# Patient Record
Sex: MALE | Race: WHITE | URBAN - METROPOLITAN AREA
[De-identification: names, ages, dates, MRNs, and addresses within clinical notes are randomized per-mention and may not be internally consistent; named-entity substitution may affect disease eponyms.]

---

## 2018-03-14 ENCOUNTER — OFFICE VISIT (OUTPATIENT)
Dept: FAMILY MEDICINE | Facility: CLINIC | Age: 38
End: 2018-03-14
Payer: COMMERCIAL

## 2018-03-14 VITALS
WEIGHT: 230.2 LBS | RESPIRATION RATE: 16 BRPM | BODY MASS INDEX: 30.51 KG/M2 | SYSTOLIC BLOOD PRESSURE: 124 MMHG | DIASTOLIC BLOOD PRESSURE: 80 MMHG | TEMPERATURE: 97.8 F | HEIGHT: 73 IN | HEART RATE: 80 BPM

## 2018-03-14 DIAGNOSIS — E66.9 NON MORBID OBESITY, UNSPECIFIED OBESITY TYPE: ICD-10-CM

## 2018-03-14 DIAGNOSIS — Z83.3 FAMILY HISTORY OF DIABETES MELLITUS: ICD-10-CM

## 2018-03-14 DIAGNOSIS — E78.2 MIXED HYPERLIPIDEMIA: Primary | ICD-10-CM

## 2018-03-14 DIAGNOSIS — R73.01 IMPAIRED FASTING GLUCOSE: ICD-10-CM

## 2018-03-14 DIAGNOSIS — Z23 NEED FOR VACCINATION: ICD-10-CM

## 2018-03-14 DIAGNOSIS — Z00.00 ENCOUNTER FOR ROUTINE ADULT HEALTH EXAMINATION WITHOUT ABNORMAL FINDINGS: Primary | ICD-10-CM

## 2018-03-14 DIAGNOSIS — Z13.220 LIPID SCREENING: ICD-10-CM

## 2018-03-14 LAB
CHOLEST SERPL-MCNC: 219 MG/DL
GLUCOSE SERPL-MCNC: 100 MG/DL (ref 70–99)
HDLC SERPL-MCNC: 42 MG/DL
LDLC SERPL CALC-MCNC: 163 MG/DL
NONHDLC SERPL-MCNC: 177 MG/DL
TRIGL SERPL-MCNC: 70 MG/DL

## 2018-03-14 PROCEDURE — 80061 LIPID PANEL: CPT | Performed by: FAMILY MEDICINE

## 2018-03-14 PROCEDURE — 82947 ASSAY GLUCOSE BLOOD QUANT: CPT | Performed by: FAMILY MEDICINE

## 2018-03-14 PROCEDURE — 99395 PREV VISIT EST AGE 18-39: CPT | Mod: 25 | Performed by: FAMILY MEDICINE

## 2018-03-14 PROCEDURE — 90471 IMMUNIZATION ADMIN: CPT | Performed by: FAMILY MEDICINE

## 2018-03-14 PROCEDURE — 90715 TDAP VACCINE 7 YRS/> IM: CPT | Performed by: FAMILY MEDICINE

## 2018-03-14 PROCEDURE — 36415 COLL VENOUS BLD VENIPUNCTURE: CPT | Performed by: FAMILY MEDICINE

## 2018-03-14 NOTE — PATIENT INSTRUCTIONS
Go to [facility] now for your blood draw.  You will be contacted in 1-2 days for results of your lab tests.    Be consistent with low trans fat and saturated fat diet.  Eat food rich in omega-3-fatty acids as you tolerate. (salmon, olive oil)  Eat 5 cups of vegetables, fruits and whole grains per day.  Limit starchy food (white rice, white bread, white pasta, white potatoes) to less than a cup per meal.  Minimize sweets, junk food and fastfood. Limit soda beverages to one serving per day; best to avoid it altogether though.  Exercise: moderate intensity sustained for at least 30 mins per episode, goal of 150 mins per week at least  Combine cardiovascular and resistance exercises.  These exercise recommendations are in addition to your daily activity at work or home.  Work on losing weight.    Preventive Health Recommendations  Male Ages 26 - 39    Yearly exam:             See your health care provider every year in order to  o   Review health changes.   o   Discuss preventive care.    o   Review your medicines if your doctor has prescribed any.    You should be tested each year for STDs (sexually transmitted diseases), if you re at risk.     After age 35, talk to your provider about cholesterol testing. If you are at risk for heart disease, have your cholesterol tested at least every 5 years.     If you are at risk for diabetes, you should have a diabetes test (fasting glucose).  Shots: Get a flu shot each year. Get a tetanus shot every 10 years.     Nutrition:    Eat at least 5 servings of fruits and vegetables daily.     Eat whole-grain bread, whole-wheat pasta and brown rice instead of white grains and rice.     Talk to your provider about Calcium and Vitamin D.     Lifestyle    Exercise for at least 150 minutes a week (30 minutes a day, 5 days a week). This will help you control your weight and prevent disease.     Limit alcohol to one drink per day.     No smoking.     Wear sunscreen to prevent skin cancer.      See your dentist every six months for an exam and cleaning.

## 2018-03-14 NOTE — PROGRESS NOTES
SUBJECTIVE:   CC: Jose Alejandro Noonan is an 37 year old male who presents for preventative health visit.     Healthy Habits:    Do you get at least three servings of calcium containing foods daily (dairy, green leafy vegetables, etc.)? yes    Amount of exercise or daily activities, outside of work: 0 day(s) per week. Patient walks a lot at work.    Problems taking medications regularly No    Medication side effects: No    Have you had an eye exam in the past two years? no    Do you see a dentist twice per year? no    Do you have sleep apnea, excessive snoring or daytime drowsiness?no         Today's PHQ-2 Score:   PHQ-2 ( 1999 Pfizer) 3/14/2018   Q1: Little interest or pleasure in doing things 0   Q2: Feeling down, depressed or hopeless 0   PHQ-2 Score 0       Abuse: Current or Past(Physical, Sexual or Emotional)- No  Do you feel safe in your environment - Yes    Social History   Substance Use Topics     Smoking status: Former Smoker     Smokeless tobacco: Never Used     Alcohol use Yes      Comment: occ      Patient denies using any other tobacco product now.    If you drink alcohol do you typically have >3 drinks per day or >7 drinks per week? No                      Last PSA: No results found for: PSA    Reviewed orders with patient. Reviewed health maintenance and updated orders accordingly - Yes  There is no problem list on file for this patient.    Past Surgical History:   Procedure Laterality Date     ORTHOPEDIC SURGERY Left 10/30/02    wrist       Social History   Substance Use Topics     Smoking status: Former Smoker     Smokeless tobacco: Never Used     Alcohol use Yes      Comment: occ     Family History   Problem Relation Age of Onset     DIABETES Sister      Other Cancer Maternal Grandmother      Thyroid Disease Sister          No current outpatient prescriptions on file.     Allergies   Allergen Reactions     Penicillins Rash       Reviewed and updated as needed this visit by clinical staff  Tobacco   "Allergies  Meds  Problems  Med Hx  Surg Hx  Fam Hx  Soc Hx          Reviewed and updated as needed this visit by Provider  Allergies  Meds  Problems        History reviewed. No pertinent past medical history.   Past Surgical History:   Procedure Laterality Date     ORTHOPEDIC SURGERY Left 10/30/02    wrist       ROS:  C: NEGATIVE for fever, chills, change in weight  I: NEGATIVE for worrisome rashes, moles or lesions  E: NEGATIVE for vision changes or irritation  ENT: NEGATIVE for ear, mouth and throat problems  R: NEGATIVE for significant cough or SOB  CV: NEGATIVE for chest pain, palpitations or peripheral edema  GI: NEGATIVE for nausea, abdominal pain, heartburn, or change in bowel habits   male: negative for dysuria, hematuria, decreased urinary stream, erectile dysfunction, urethral discharge  M: NEGATIVE for significant arthralgias or myalgia  N: NEGATIVE for weakness, dizziness or paresthesias  E: NEGATIVE for temperature intolerance, skin/hair changes  H: NEGATIVE for bleeding problems  P: NEGATIVE for changes in mood or affect    OBJECTIVE:   /80  Pulse 80  Temp 97.8  F (36.6  C) (Tympanic)  Resp 16  Ht 6' 0.75\" (1.848 m)  Wt 230 lb 3.2 oz (104.4 kg)  BMI 30.58 kg/m2  EXAM:  GENERAL APPEARANCE: borderline obese, alert and no distress  EYES: pink conj, no icterus, PERRL, EOMI  HENT: ear canals and TM's normal, nose and mouth without ulcers or lesions, oropharynx clear and oral mucous membranes moist  NECK: no adenopathy, no asymmetry, masses, or scars and thyroid normal to palpation  RESP: lungs clear to auscultation - no rales, rhonchi or wheezes  CV: regular rates and rhythm, normal S1 S2, no S3 or S4, no murmur, click or rub, no peripheral edema and peripheral pulses strong  ABDOMEN: soft, nontender, no hepatosplenomegaly, no masses and bowel sounds normal  RECTAL: deferred  MS: no musculoskeletal defects are noted and gait is age appropriate without ataxia  SKIN: no suspicious " "lesions or rashes  NEURO: Normal strength and tone, sensory exam grossly normal, mentation intact and speech normal    ASSESSMENT/PLAN:   Jose Alejandro was seen today for physical.    Diagnoses and all orders for this visit:    Encounter for routine adult health examination without abnormal findings  -     Glucose  -     Lipid panel reflex to direct LDL Fasting  Patient was advised on recommended screening and preventive health recommendations.  He verbalized understanding and agreed to the plans below.    Non morbid obesity, unspecified obesity type  -     Glucose  Discussed risks of obesity: heart disease, stroke, end-organ damage, DM, decreased quality of life  Counselled on diet, exercise and weight loss regimen    Need for vaccination  -     TDAP VACCINE (ADACEL) [82355.002]  -     1st  Administration  [58924]    Family history of diabetes mellitus  -     Glucose    Lipid screening  -     Lipid panel reflex to direct LDL Fasting        COUNSELING:  Reviewed preventive health counseling, as reflected in patient instructions       Regular exercise       Healthy diet/nutrition       Vision screening       Hearing screening       Immunizations    Vaccinated for: TDAP             Alcohol Use       Family planning       Safe sex practices/STD prevention       HIV screeninx in teen years, 1x in adult years, and at intervals if high risk - patient said he had one in last 1-2 years and was negative. Patient denies any risks.       Osteoporosis Prevention/Bone Health    BP Screening:   Last 3 BP Readings:    BP Readings from Last 3 Encounters:   18 124/80   09/10/15 138/78       The following was recommended to the patient:  Re-screen BP within a year and recommended lifestyle modifications   reports that he has quit smoking. He has never used smokeless tobacco.    Estimated body mass index is 30.58 kg/(m^2) as calculated from the following:    Height as of this encounter: 6' 0.75\" (1.848 m).    Weight as of this " encounter: 230 lb 3.2 oz (104.4 kg).   Weight management plan: Discussed healthy diet and exercise guidelines and patient will follow up in 12 months in clinic to re-evaluate.    Counseling Resources:  ATP IV Guidelines  Pooled Cohorts Equation Calculator  FRAX Risk Assessment  ICSI Preventive Guidelines  Dietary Guidelines for Americans, 2010  USDA's MyPlate  ASA Prophylaxis  Lung CA Screening    Niall Collins MD  [facility]

## 2018-03-14 NOTE — MR AVS SNAPSHOT
After Visit Summary   3/14/2018    Jose Alejandro Noonan    MRN: [id number]           Patient Information     Date Of Birth          1980        Visit Information        Provider Department      3/14/2018 9:00 AM Niall Collins MD [facility]        Today's Diagnoses     Encounter for routine adult health examination without abnormal findings    -  1    Non morbid obesity, unspecified obesity type        Need for vaccination        Family history of diabetes mellitus        Lipid screening          Care Instructions    Go to [facility] now for your blood draw.  You will be contacted in 1-2 days for results of your lab tests.    Be consistent with low trans fat and saturated fat diet.  Eat food rich in omega-3-fatty acids as you tolerate. (salmon, olive oil)  Eat 5 cups of vegetables, fruits and whole grains per day.  Limit starchy food (white rice, white bread, white pasta, white potatoes) to less than a cup per meal.  Minimize sweets, junk food and fastfood. Limit soda beverages to one serving per day; best to avoid it altogether though.  Exercise: moderate intensity sustained for at least 30 mins per episode, goal of 150 mins per week at least  Combine cardiovascular and resistance exercises.  These exercise recommendations are in addition to your daily activity at work or home.  Work on losing weight.    Preventive Health Recommendations  Male Ages 26 - 39    Yearly exam:             See your health care provider every year in order to  o   Review health changes.   o   Discuss preventive care.    o   Review your medicines if your doctor has prescribed any.    You should be tested each year for STDs (sexually transmitted diseases), if you re at risk.     After age 35, talk to your provider about cholesterol testing. If you are at risk for heart disease, have your cholesterol tested at least every 5 years.     If you are at risk for diabetes, you should have a diabetes test  "(fasting glucose).  Shots: Get a flu shot each year. Get a tetanus shot every 10 years.     Nutrition:    Eat at least 5 servings of fruits and vegetables daily.     Eat whole-grain bread, whole-wheat pasta and brown rice instead of white grains and rice.     Talk to your provider about Calcium and Vitamin D.     Lifestyle    Exercise for at least 150 minutes a week (30 minutes a day, 5 days a week). This will help you control your weight and prevent disease.     Limit alcohol to one drink per day.     No smoking.     Wear sunscreen to prevent skin cancer.     See your dentist every six months for an exam and cleaning.             Follow-ups after your visit        Follow-up notes from your care team     Return if symptoms worsen or fail to improve.      Who to contact     If you have questions or need follow up information about today's clinic visit or your schedule please contact [facility] directly at [phone].  Normal or non-critical lab and imaging results will be communicated to you by MyChart, letter or phone within 4 business days after the clinic has received the results. If you do not hear from us within 7 days, please contact the clinic through [employer] or phone. If you have a critical or abnormal lab result, we will notify you by phone as soon as possible.  Submit refill requests through [employer] or call your pharmacy and they will forward the refill request to us. Please allow 3 business days for your refill to be completed.          Additional Information About Your Visit        [employer] Information     [employer] lets you send messages to your doctor, view your test results, renew your prescriptions, schedule appointments and more. To sign up, go to [email or web address] . Click on \"Log in\" on the left side of the screen, which will take you to the Welcome page. Then click on \"Sign up Now\" on the right side of the page.     You will be asked to enter the access code listed below, as " "well as some personal information. Please follow the directions to create your username and password.     Your access code is: [email or web address]  Expires: 2018  9:39 AM     Your access code will  in 90 days. If you need help or a new code, please call your [place] clinic or [phone].        Care EveryWhere ID     This is your Care EveryWhere ID. This could be used by other organizations to access your [place] medical records  [id number]        Your Vitals Were     Pulse Temperature Respirations Height BMI (Body Mass Index)       80 97.8  F (36.6  C) (Tympanic) 16 6' 0.75\" (1.848 m) 30.58 kg/m2        Blood Pressure from Last 3 Encounters:   18 124/80   09/10/15 138/78    Weight from Last 3 Encounters:   18 230 lb 3.2 oz (104.4 kg)   09/10/15 204 lb (92.5 kg)              We Performed the Following     1st  Administration  [59494]     Glucose     Lipid panel reflex to direct LDL Fasting     TDAP VACCINE (ADACEL) [22933.002]        Primary Care Provider Fax #    Physician No Ref-Primary [phone]       No address on file        Equal Access to Services     DONNA LAFLEUR : Alexandro Martin, waaxda lujuliaadaha, qaybta kaalmada adegregoryyada, david griffiths. So [facility] [phone].    ATENCIÓN: Si habla español, tiene a vickers disposición servicios gratuitos de asistencia lingüística. Llame al [phone].    We comply with applicable federal civil rights laws and [place] laws. We do not discriminate on the basis of race, color, national origin, age, disability, sex, sexual orientation, or gender identity.            Thank you!     Thank you for choosing [facility]  for your care. Our goal is always to provide you with excellent care. Hearing back from our patients is one way we can continue to improve our services. Please take a few minutes to complete the written survey that you may receive in the mail after your visit with us. Thank you!           "   Your Updated Medication List - Protect others around you: Learn how to safely use, store and throw away your medicines at www.disposemymeds.org.      Notice  As of 3/14/2018  9:41 AM    You have not been prescribed any medications.

## 2018-03-14 NOTE — NURSING NOTE
Screening Questionnaire for Adult Immunization    Are you sick today?   No   Do you have allergies to medications, food, a vaccine component or latex?   Yes   Have you ever had a serious reaction after receiving a vaccination?   No   Do you have a long-term health problem with heart disease, lung disease, asthma, kidney disease, metabolic disease (e.g. diabetes), anemia, or other blood disorder?   No   Do you have cancer, leukemia, HIV/AIDS, or any other immune system problem?   No   In the past 3 months, have you taken medications that affect  your immune system, such as prednisone, other steroids, or anticancer drugs; drugs for the treatment of rheumatoid arthritis, Crohn s disease, or psoriasis; or have you had radiation treatments?   No   Have you had a seizure, or a brain or other nervous system problem?   No   During the past year, have you received a transfusion of blood or blood     products, or been given immune (gamma) globulin or antiviral drug?   No   For women: Are you pregnant or is there a chance you could become        pregnant during the next month?   No   Have you received any vaccinations in the past 4 weeks?   No     Immunization questionnaire was positive for at least one answer.  Notified Dr. Collins.        Per orders of Dr. Collins, injection of Tdap given by Brunilda Booth. Patient instructed to remain in clinic for 15 minutes afterwards, and to report any adverse reaction to me immediately.       Screening performed by Brunilda Booth on 3/14/2018 at 9:08 AM.

## 2018-03-14 NOTE — NURSING NOTE
"Chief Complaint   Patient presents with     Physical       Initial /80  Pulse 80  Temp 97.8  F (36.6  C) (Tympanic)  Resp 16  Ht 6' 0.75\" (1.848 m)  Wt 230 lb 3.2 oz (104.4 kg)  BMI 30.58 kg/m2 Estimated body mass index is 30.58 kg/(m^2) as calculated from the following:    Height as of this encounter: 6' 0.75\" (1.848 m).    Weight as of this encounter: 230 lb 3.2 oz (104.4 kg).  Medication Reconciliation: complete    "

## 2018-03-19 PROBLEM — E66.9 NON MORBID OBESITY, UNSPECIFIED OBESITY TYPE: Status: ACTIVE | Noted: 2018-03-19

## 2020-03-10 ENCOUNTER — HEALTH MAINTENANCE LETTER (OUTPATIENT)
Age: 40
End: 2020-03-10

## 2020-12-27 ENCOUNTER — HEALTH MAINTENANCE LETTER (OUTPATIENT)
Age: 40
End: 2020-12-27

## 2021-04-24 ENCOUNTER — HEALTH MAINTENANCE LETTER (OUTPATIENT)
Age: 41
End: 2021-04-24

## 2021-10-04 ENCOUNTER — HEALTH MAINTENANCE LETTER (OUTPATIENT)
Age: 41
End: 2021-10-04

## 2022-05-15 ENCOUNTER — HEALTH MAINTENANCE LETTER (OUTPATIENT)
Age: 42
End: 2022-05-15

## 2022-09-11 ENCOUNTER — HEALTH MAINTENANCE LETTER (OUTPATIENT)
Age: 42
End: 2022-09-11

## 2023-06-03 ENCOUNTER — HEALTH MAINTENANCE LETTER (OUTPATIENT)
Age: 43
End: 2023-06-03